# Patient Record
(demographics unavailable — no encounter records)

---

## 2024-09-04 NOTE — US
Site ID  [facility]  

 

Patient  Mariana Wright L       

ID  [id number]      

  1989  Age/Gender:  35Y, F  

Order #  [id number]      

Accession #  [id number]      

Procedure  US gallbladder      

Date  2024 1:18:00 PM      

Reason  Diarrhea x's 3 weeks      

 

INDICATION: 

Patient age:Female;  35 years old; 

Reason for study: Diarrhea x's 3 weeks; [facility].

 

COMPARISON: None, please note PACS Production downtime occurred during the radiologist interpretation
 of these images with limited priors/reports..

 

TECHNIQUE:

Multiple grayscale and color doppler ultrasound images of the right upper abdomen obtained utilizing 
transabdominal imaging. Delayed interpretation due to institutional cyber attack.

 

FINDINGS:

 

PANCREAS:

The visualized portions of the pancreas are unremarkable.

 

LIVER:

The liver demonstrates a normal echotexture. There is no evidence of dilated ducts, cystic structures
, or solid mass.  

 

GALLBLADDER:

The gallbladder is without evidence of wall thickening, pericholecystic fluid, or cholelithiasis. The
 common duct measures approximately 4 mm. Per sonographer, the sonographic Figueroa's sign was negative
.

 

RIGHT KIDNEY:

The right kidney measures 9.8 x 4.2 x 4.5 cm, without evidence of hydronephrosis or contour deforming
 solid mass. Shadowing echogenic focus within lower pole measuring up to 0.8 cm. Renal parenchymal ec
hogenicity is within normal limits.

 

 

IMPRESSION: 

1. No sonographic evidence for acute process.

2. Nonobstructive right renal calculus.

## 2024-12-22 NOTE — ED
General Adult HPI





- General


Chief complaint: Shortness of Breath


Stated complaint: DEE DEE/congestion


Time Seen by Provider: 24 12:00


Source: patient, RN notes reviewed


Mode of arrival: ambulatory


Limitations: no limitations





- History of Present Illness


Initial comments: 


35-year-old female presents emergency department chief complaint cough cold 

symptoms symptoms have been present for 5 days she states she has a productive 

cough continues to smoke at home history of asthma.  Patient denies complaints 

of chest pain no dizziness does complain of mild bodyaches without any GI 

symptoms.








- Related Data


                                Home Medications











 Medication  Instructions  Recorded  Confirmed


 


Vitamin D3 (Unknown Strength) 1 dose PO DAILY 24








                                  Previous Rx's











 Medication  Instructions  Recorded


 


Albuterol Inhaler [Ventolin Hfa 1 - 2 puff INHALATION Q6H PRN #1 24





Inhaler] each 


 


methylPREDNISolone Dose Pack 4 mg PO AS DIRECTED #1 packet 24





[Medrol Dose Pack]  











                                    Allergies











Allergy/AdvReac Type Severity Reaction Status Date / Time


 


No Known Allergies Allergy   Verified 24 12:41














Review of Systems


ROS Statement: 


Those systems with pertinent positive or pertinent negative responses have been 

documented in the HPI.





ROS Other: All systems not noted in ROS Statement are negative.





Past Medical History


Additional Past Medical History / Comment(s): neurological tic,  chronic back 

pain , hole in heart x1.  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


History of Any Multi-Drug Resistant Organisms: None Reported


Past Surgical History:  Section, Tonsillectomy


Past Anesthesia/Blood Transfusion Reactions: No Reported Reaction


Past Psychological History: Anxiety, Bipolar, Depression


Smoking Status: Current every day smoker


Past Alcohol Use History: None Reported


Past Drug Use History: None Reported





- Past Family History


  ** Mother


History Unknown: Yes





General Exam


Limitations: no limitations


General appearance: alert, in no apparent distress


Head exam: Present: atraumatic, normocephalic, normal inspection


Eye exam: Present: normal appearance, PERRL, EOMI.  Absent: scleral icterus, 

conjunctival injection, periorbital swelling


ENT exam: Present: normal exam, normal oropharynx, mucous membranes moist


Neck exam: Present: normal inspection, full ROM.  Absent: tenderness, 

meningismus, lymphadenopathy


Respiratory exam: Present: wheezes.  Absent: normal lung sounds bilaterally, 

respiratory distress, rales, rhonchi, stridor


Cardiovascular Exam: Present: normal rhythm, tachycardia, normal heart sounds.  

Absent: systolic murmur, diastolic murmur, rubs, gallop, clicks





Course


                                   Vital Signs











  24





  12:03 13:16


 


Temperature 98 F 


 


Pulse Rate 115 H 100


 


Respiratory 24 16





Rate  


 


Blood Pressure 104/73 116/86


 


O2 Sat by Pulse 99 98





Oximetry  














Medical Decision Making





- Medical Decision Making


Was pt. sent in by a medical professional or institution (, PA, NP, urgent 

care, hospital, or nursing home...) When possible be specific


@ -No


Did you speak to anyone other than the patient for history (EMS, parent, family,

police, friend...)? What history was obtained from this source 


@ -No


Did you review nursing and triage notes (agree or disagree)? Why? 


@ -I reviewed and agree with nursing and triage notes


Were old charts reviewed (outside hosp., previous admission, EMS record, old 

EKG, old radiological studies, urgent care reports/EKG's, nursing home records)?

Report findings 


@ -No old charts were reviewed


Differential Diagnosis (chest pain, altered mental status, abdominal pain women,

abdominal pain men, vaginal bleeding, weakness, fever, dyspnea, syncope, 

headache, dizziness, GI bleed, back pain, seizure, CVA, palpatations, mental 

health, musculoskeletal)? 


@ -[COVID 19, RSV, influenza, pneumonia, acute bronchitis, URI, this list is not

 all inclusive


EKG interpreted by me (3pts min.).


@ -None


X-rays interpreted by me (1pt min.).


@ -Chest x-ray shows no acute cardiopulmonary process


CT interpreted by me (1pt min.).


@ -None done


U/S interpreted by me (1pt. min.).


@ -None done


What testing was considered but not performed or refused? (CT, X-rays, U/S, 

labs)? Why?


@ -[Consider Cepheid swab and blood work patient refused stating nothing pokes 

her or goes up her nose


What meds were considered but not given or refused? Why?


@ -None


Did you discuss the management of the patient with other professionals 

(professionals i.e. [facility] Nixon, NP, lab, RT, psych nurse, , , 

teacher, , )? Give summary


@ -No


Was smoking cessation discussed for >3mins.?


@ -No


Was critical care preformed (if so, how long)?


@ -No


Were there social determinants of health that impacted care today? How? 

(Homelessness, low income, unemployed, alcoholism, drug addiction, 

transportation, low edu. Level, literacy, decrease access to med. care, shelter, 

rehab)?


@ -No


Was there de-escalation of care discussed even if they declined (Discuss DNR or 

withdrawal of care, Hospice)? DNR status


@ -No


What co-morbidities impacted this encounter? (DM, HTN, Smoking, COPD, CAD, 

Cancer, CVA, ARF, Chemo, Hep., AIDS, mental health diagnosis, sleep apnea, 

morbid obesity)?


@ -None


Was patient admitted / discharged? Hospital course, mention meds given and 

route, prescriptions, significant lab abnormalities, going to OR and other 

pertinent info.


@ -Discharge patient presented for URI symptoms.  Patient had mild tachycardia 

patient had recommendations of blood work Cepheid swab and x-ray patient refused

 all but x-ray patient will be treated for asthma exacerbation and advised that 

she needs to have very close follow-up return parameters discussed and risk of 

leaving discussed


Undiagnosed new problem with uncertain prognosis?


@ -No


Drug Therapy requiring intensive monitoring for toxicity (Heparin, Nitro, 

Insulin, Cardizem)?


@ -No


Were any procedures done?


@ -No


Diagnosis/symptom?


@ -URI, asthma


Acute, or Chronic, or Acute on Chronic?


@ -Acute


Uncomplicated (without systemic symptoms) or Complicated (systemic symptoms)?


@ -Complicated


Side effects of treatment?


@ -No


Exacerbation, Progression, or Severe Exacerbation?


@ -No


Poses a threat to life or bodily function? How? (Chest pain, USA, MI, pneumonia,

 PE, COPD, DKA, ARF, appy, cholecystitis, CVA, Diverticulitis, Homicidal, 

Suicidal, threat to staff... and all critical care pts)


@ -No








Disposition


Clinical Impression: 


 URI (upper respiratory infection), Asthma





Disposition: HOME SELF-CARE


Condition: Stable


Instructions (If sedation given, give patient instructions):  Acute Bronchitis 

(ED)


Additional Instructions: 


Please return to the Emergency Department if symptoms worsen or any other 

concerns.


Prescriptions: 


methylPREDNISolone Dose Pack [Medrol Dose Pack] 4 mg PO AS DIRECTED #1 packet


Albuterol Inhaler [Ventolin Hfa Inhaler] 1 - 2 puff INHALATION Q6H PRN #1 each


 PRN Reason: Shortness Of Breath


Is patient prescribed a controlled substance at d/c from ED?: No


Referrals: 


Nan Tejada MD [Primary Care Provider] - 1-2 days


Time of Disposition: 13:29

## 2024-12-22 NOTE — XR
EXAMINATION TYPE: XR chest 2V

 

DATE OF EXAM: 12/22/2024 12:48 PM

 

COMPARISON: None

 

CLINICAL INDICATION: Female, 35 years old with history of sob; H

 

TECHNIQUE: XR chest 2V Frontal and lateral views of the chest.

 

FINDINGS: 

Lungs/Pleura: There is no evidence of pleural effusion, focal consolidation, or pneumothorax.  

Pulmonary vascularity: Unremarkable.

Heart/mediastinum: Cardiomediastinal silhouette is unremarkable.

Musculoskeletal: No acute osseous pathology.

 

IMPRESSION: 

No acute cardiopulmonary disease/process.

 

 

 

[facility], Workstation: [id number], 12/22/2024 1:13 PM